# Patient Record
Sex: MALE | Race: WHITE | Employment: OTHER | URBAN - METROPOLITAN AREA
[De-identification: names, ages, dates, MRNs, and addresses within clinical notes are randomized per-mention and may not be internally consistent; named-entity substitution may affect disease eponyms.]

---

## 2017-07-10 ENCOUNTER — OFFICE VISIT (OUTPATIENT)
Dept: CARDIOLOGY CLINIC | Age: 62
End: 2017-07-10

## 2017-07-10 VITALS
DIASTOLIC BLOOD PRESSURE: 60 MMHG | BODY MASS INDEX: 28.34 KG/M2 | WEIGHT: 213.8 LBS | SYSTOLIC BLOOD PRESSURE: 100 MMHG | HEIGHT: 73 IN | OXYGEN SATURATION: 97 % | HEART RATE: 63 BPM

## 2017-07-10 DIAGNOSIS — E78.00 PURE HYPERCHOLESTEROLEMIA: Chronic | ICD-10-CM

## 2017-07-10 DIAGNOSIS — I25.10 ATHEROSCLEROSIS OF NATIVE CORONARY ARTERY OF NATIVE HEART WITHOUT ANGINA PECTORIS: Primary | ICD-10-CM

## 2017-07-10 DIAGNOSIS — I10 ESSENTIAL HYPERTENSION, BENIGN: ICD-10-CM

## 2017-07-10 PROCEDURE — 99214 OFFICE O/P EST MOD 30 MIN: CPT | Performed by: INTERNAL MEDICINE

## 2017-07-10 ASSESSMENT — ENCOUNTER SYMPTOMS
BLOOD IN STOOL: 0
SHORTNESS OF BREATH: 0
EYE REDNESS: 0
ABDOMINAL DISTENTION: 0
COUGH: 0
WHEEZING: 0

## 2020-12-11 ENCOUNTER — HOSPITAL ENCOUNTER (INPATIENT)
Dept: CARDIAC CATH/INVASIVE PROCEDURES | Age: 65
LOS: 1 days | Discharge: HOME | DRG: 250 | End: 2020-12-12
Attending: INTERNAL MEDICINE | Admitting: INTERNAL MEDICINE
Payer: MEDICARE

## 2020-12-11 PROBLEM — I21.4 NSTEMI (NON-ST ELEVATED MYOCARDIAL INFARCTION) (HCC): Status: ACTIVE | Noted: 2020-12-11

## 2020-12-11 LAB
CREAT SERPL-MCNC: <0.5 MG/DL (ref 0.8–1.3)
GFR AFRICAN AMERICAN: >60
GFR NON-AFRICAN AMERICAN: >60
GLUCOSE BLD-MCNC: 220 MG/DL (ref 70–99)
PERFORMED ON: ABNORMAL
POC ACT LR: 191 SEC
POC ACT LR: 242 SEC
POC ACT LR: 274 SEC

## 2020-12-11 PROCEDURE — 2500000003 HC RX 250 WO HCPCS

## 2020-12-11 PROCEDURE — 99152 MOD SED SAME PHYS/QHP 5/>YRS: CPT

## 2020-12-11 PROCEDURE — C1894 INTRO/SHEATH, NON-LASER: HCPCS

## 2020-12-11 PROCEDURE — 92924 PRQ TRLUML C ATHRC 1 ART&/BR: CPT | Performed by: INTERNAL MEDICINE

## 2020-12-11 PROCEDURE — 2580000003 HC RX 258

## 2020-12-11 PROCEDURE — 99223 1ST HOSP IP/OBS HIGH 75: CPT | Performed by: INTERNAL MEDICINE

## 2020-12-11 PROCEDURE — 2709999900 HC NON-CHARGEABLE SUPPLY

## 2020-12-11 PROCEDURE — 93458 L HRT ARTERY/VENTRICLE ANGIO: CPT

## 2020-12-11 PROCEDURE — C1724 CATH, TRANS ATHEREC,ROTATION: HCPCS

## 2020-12-11 PROCEDURE — 6360000002 HC RX W HCPCS: Performed by: INTERNAL MEDICINE

## 2020-12-11 PROCEDURE — 85347 COAGULATION TIME ACTIVATED: CPT

## 2020-12-11 PROCEDURE — C1887 CATHETER, GUIDING: HCPCS

## 2020-12-11 PROCEDURE — 6360000004 HC RX CONTRAST MEDICATION: Performed by: INTERNAL MEDICINE

## 2020-12-11 PROCEDURE — B2151ZZ FLUOROSCOPY OF LEFT HEART USING LOW OSMOLAR CONTRAST: ICD-10-PCS | Performed by: INTERNAL MEDICINE

## 2020-12-11 PROCEDURE — 6360000002 HC RX W HCPCS

## 2020-12-11 PROCEDURE — 2100000000 HC CCU R&B

## 2020-12-11 PROCEDURE — 4A023N7 MEASUREMENT OF CARDIAC SAMPLING AND PRESSURE, LEFT HEART, PERCUTANEOUS APPROACH: ICD-10-PCS | Performed by: INTERNAL MEDICINE

## 2020-12-11 PROCEDURE — 93458 L HRT ARTERY/VENTRICLE ANGIO: CPT | Performed by: INTERNAL MEDICINE

## 2020-12-11 PROCEDURE — 99152 MOD SED SAME PHYS/QHP 5/>YRS: CPT | Performed by: INTERNAL MEDICINE

## 2020-12-11 PROCEDURE — 82565 ASSAY OF CREATININE: CPT

## 2020-12-11 PROCEDURE — 6370000000 HC RX 637 (ALT 250 FOR IP): Performed by: INTERNAL MEDICINE

## 2020-12-11 PROCEDURE — C1769 GUIDE WIRE: HCPCS

## 2020-12-11 PROCEDURE — 02C03ZZ EXTIRPATION OF MATTER FROM CORONARY ARTERY, ONE ARTERY, PERCUTANEOUS APPROACH: ICD-10-PCS | Performed by: INTERNAL MEDICINE

## 2020-12-11 PROCEDURE — 93005 ELECTROCARDIOGRAM TRACING: CPT | Performed by: INTERNAL MEDICINE

## 2020-12-11 PROCEDURE — 92924 PRQ TRLUML C ATHRC 1 ART&/BR: CPT

## 2020-12-11 PROCEDURE — B2111ZZ FLUOROSCOPY OF MULTIPLE CORONARY ARTERIES USING LOW OSMOLAR CONTRAST: ICD-10-PCS | Performed by: INTERNAL MEDICINE

## 2020-12-11 PROCEDURE — 99153 MOD SED SAME PHYS/QHP EA: CPT

## 2020-12-11 PROCEDURE — 36415 COLL VENOUS BLD VENIPUNCTURE: CPT

## 2020-12-11 PROCEDURE — C1725 CATH, TRANSLUMIN NON-LASER: HCPCS

## 2020-12-11 RX ORDER — ASPIRIN 81 MG/1
81 TABLET ORAL DAILY
Status: DISCONTINUED | OUTPATIENT
Start: 2020-12-12 | End: 2020-12-12 | Stop reason: HOSPADM

## 2020-12-11 RX ORDER — ACETAMINOPHEN 325 MG/1
650 TABLET ORAL EVERY 4 HOURS PRN
Status: DISCONTINUED | OUTPATIENT
Start: 2020-12-11 | End: 2020-12-12 | Stop reason: HOSPADM

## 2020-12-11 RX ORDER — ONDANSETRON 2 MG/ML
4 INJECTION INTRAMUSCULAR; INTRAVENOUS EVERY 6 HOURS PRN
Status: DISCONTINUED | OUTPATIENT
Start: 2020-12-11 | End: 2020-12-12 | Stop reason: HOSPADM

## 2020-12-11 RX ORDER — MORPHINE SULFATE 2 MG/ML
2 INJECTION, SOLUTION INTRAMUSCULAR; INTRAVENOUS
Status: ACTIVE | OUTPATIENT
Start: 2020-12-11 | End: 2020-12-11

## 2020-12-11 RX ORDER — ATORVASTATIN CALCIUM 40 MG/1
40 TABLET, FILM COATED ORAL NIGHTLY
Status: DISCONTINUED | OUTPATIENT
Start: 2020-12-11 | End: 2020-12-12 | Stop reason: HOSPADM

## 2020-12-11 RX ORDER — NICOTINE POLACRILEX 4 MG
15 LOZENGE BUCCAL PRN
Status: DISCONTINUED | OUTPATIENT
Start: 2020-12-11 | End: 2020-12-12 | Stop reason: HOSPADM

## 2020-12-11 RX ORDER — HYDRALAZINE HYDROCHLORIDE 20 MG/ML
10 INJECTION INTRAMUSCULAR; INTRAVENOUS
Status: DISCONTINUED | OUTPATIENT
Start: 2020-12-11 | End: 2020-12-12 | Stop reason: HOSPADM

## 2020-12-11 RX ORDER — DEXTROSE MONOHYDRATE 50 MG/ML
100 INJECTION, SOLUTION INTRAVENOUS PRN
Status: DISCONTINUED | OUTPATIENT
Start: 2020-12-11 | End: 2020-12-12 | Stop reason: HOSPADM

## 2020-12-11 RX ORDER — EPTIFIBATIDE 0.75 MG/ML
2 INJECTION, SOLUTION INTRAVENOUS CONTINUOUS
Status: DISPENSED | OUTPATIENT
Start: 2020-12-11 | End: 2020-12-12

## 2020-12-11 RX ORDER — METOPROLOL SUCCINATE 50 MG/1
50 TABLET, EXTENDED RELEASE ORAL DAILY
Status: DISCONTINUED | OUTPATIENT
Start: 2020-12-11 | End: 2020-12-12 | Stop reason: HOSPADM

## 2020-12-11 RX ORDER — ATROPINE SULFATE 0.4 MG/ML
0.5 AMPUL (ML) INJECTION
Status: ACTIVE | OUTPATIENT
Start: 2020-12-11 | End: 2020-12-11

## 2020-12-11 RX ORDER — 0.9 % SODIUM CHLORIDE 0.9 %
250 INTRAVENOUS SOLUTION INTRAVENOUS PRN
Status: DISCONTINUED | OUTPATIENT
Start: 2020-12-11 | End: 2020-12-12 | Stop reason: HOSPADM

## 2020-12-11 RX ORDER — LISINOPRIL 40 MG/1
40 TABLET ORAL DAILY
Status: DISCONTINUED | OUTPATIENT
Start: 2020-12-11 | End: 2020-12-12 | Stop reason: HOSPADM

## 2020-12-11 RX ORDER — INSULIN GLARGINE 100 [IU]/ML
55 INJECTION, SOLUTION SUBCUTANEOUS NIGHTLY
Status: DISCONTINUED | OUTPATIENT
Start: 2020-12-11 | End: 2020-12-12 | Stop reason: HOSPADM

## 2020-12-11 RX ORDER — SODIUM CHLORIDE 0.9 % (FLUSH) 0.9 %
10 SYRINGE (ML) INJECTION EVERY 12 HOURS SCHEDULED
Status: DISCONTINUED | OUTPATIENT
Start: 2020-12-11 | End: 2020-12-12 | Stop reason: HOSPADM

## 2020-12-11 RX ORDER — DEXTROSE MONOHYDRATE 25 G/50ML
12.5 INJECTION, SOLUTION INTRAVENOUS PRN
Status: DISCONTINUED | OUTPATIENT
Start: 2020-12-11 | End: 2020-12-12 | Stop reason: HOSPADM

## 2020-12-11 RX ORDER — SODIUM CHLORIDE 0.9 % (FLUSH) 0.9 %
10 SYRINGE (ML) INJECTION PRN
Status: DISCONTINUED | OUTPATIENT
Start: 2020-12-11 | End: 2020-12-12 | Stop reason: HOSPADM

## 2020-12-11 RX ADMIN — ATORVASTATIN CALCIUM 40 MG: 40 TABLET, FILM COATED ORAL at 20:33

## 2020-12-11 RX ADMIN — IOPAMIDOL 120 ML: 755 INJECTION, SOLUTION INTRAVENOUS at 16:55

## 2020-12-11 RX ADMIN — EPTIFIBATIDE 2 MCG/KG/MIN: 75 INJECTION INTRAVENOUS at 23:14

## 2020-12-11 RX ADMIN — LISINOPRIL 40 MG: 40 TABLET ORAL at 23:14

## 2020-12-11 RX ADMIN — INSULIN GLARGINE 55 UNITS: 100 INJECTION, SOLUTION SUBCUTANEOUS at 20:33

## 2020-12-11 RX ADMIN — INSULIN LISPRO 2 UNITS: 100 INJECTION, SOLUTION INTRAVENOUS; SUBCUTANEOUS at 20:44

## 2020-12-11 RX ADMIN — EPTIFIBATIDE 2 MCG/KG/MIN: 75 INJECTION INTRAVENOUS at 17:39

## 2020-12-11 RX ADMIN — INSULIN LISPRO 2 UNITS: 100 INJECTION, SOLUTION INTRAVENOUS; SUBCUTANEOUS at 18:54

## 2020-12-11 RX ADMIN — TICAGRELOR 90 MG: 90 TABLET ORAL at 20:33

## 2020-12-11 ASSESSMENT — PAIN SCALES - GENERAL: PAINLEVEL_OUTOF10: 0

## 2020-12-11 NOTE — ANESTHESIA PRE-OP
Brief Pre-Op Note/Sedation Assessment      Cathi Part  1955  CATH/NONE      [id number]  3:14 PM    Planned Procedure: Cardiac Catheterization Procedure    Post Procedure Plan: Return to same level of care    Consent: I have discussed with the patient and/or the patient representative the indication, alternatives, and the possible risks and/or complications of the planned procedure and the anesthesia methods. The patient and/or patient representative appear to understand and agree to proceed. Chief Complaint: NSTEMI      Indications for Cath Procedure:  ACS > 24 hrs  Anginal Classification within 2 weeks:  CCS IV - Inability to perform any activity without angina or angina at rest, i.e., severe limitation  NYHA Heart Failure Class within 2 weeks: No symptoms  Is Cath Lab Visit Valve-related?: No  Surgical Risk: N/A  Functional Type: N/A    Anti- Anginal Meds within 2 weeks:   Yes: Beta Blockers, Aspirin and Statin (Any)    Stress or Imaging Studies Performed (within 6 months):  None     Vital Signs:  BP (!) 147/83   Pulse 59   Temp 97.8 °F (36.6 °C) (Infrared)   Resp 11   Ht 6' 1\" (1.854 m)   Wt 211 lb (95.7 kg)   SpO2 98%   BMI 27.84 kg/m²     Allergies: Allergies   Allergen Reactions    Kenalog [Triamcinolone Acetonide]     Pcn [Penicillins]     Sulfa Antibiotics        Past Medical History:  Past Medical History:   Diagnosis Date    CAD (coronary artery disease)     s/p RCA Taxus stent 04.   Nuc GXT 12/09 elroy    Diabetes     managed by PCP    HTN (hypertension)     controlled    Hyperlipidemia     rec semi annual lipids with LDL goal <70    Tobacco user     cessation discussed         Surgical History:  Past Surgical History:   Procedure Laterality Date    CARDIAC CATHETERIZATION      CHOLECYSTECTOMY      CORONARY ANGIOPLASTY WITH STENT PLACEMENT      LUNG SURGERY      MALIGNANT SKIN LESION EXCISION           Medications:  No current facility-administered medications for this encounter. Pre-Sedation:    Pre-Sedation Documentation and Exam:  I have personally completed a history, physical exam & review of systems for this patient (see notes). Prior History of Anesthesia Complications:   none    Modified Mallampati:  II (soft palate, uvula, fauces visible)    ASA Classification:  Class 2 - A normal healthy patient with mild systemic disease      Lisbet Scale: Activity:  2 - Able to move 4 extremities voluntarily on command  Respiration:  2 - Able to breathe deeply and cough freely  Circulation:  2 - BP+/- 20mmHg of normal  Consciousness:  2 - Fully awake  Oxygen Saturation (color):  2 - Able to maintain oxygen saturation >92% on room air    Sedation/Anesthesia Plan:  Guard the patient's safety and welfare. Minimize physical discomfort and pain. Minimize negative psychological responses to treatment by providing sedation and analgesia and maximize the potential amnesia. Patient to meet pre-procedure discharge plan.     Medication Planned:  midazolam intravenously and fentanyl intravenously    Patient is an appropriate candidate for plan of sedation: yes      Electronically signed by Molly Espana MD on 12/11/2020 at 3:14 PM

## 2020-12-11 NOTE — H&P
Möhe 62  [birth year]    December 11, 2020      CC: Chest Pain    HPI:  The patient is 76 y.o. male with a past medical history significant for type 2 DM and CAD with [rior RCA interventions by Dr. Real Marsh in 2004. He has really not followed up since then. He presented to [facility] with chest pain that is exertional in nature. It has been occurring for several months with less and less exertion. Over the last few days this has worsened. He had chest pain the other day that persisted so he came to the hospital. There was radiation of the pain into his right arm. This reminds him of his prior angina from 2004 but worse. He is having some mild chest pain at present. He was diagnosed with a NSTEMI at [facility]. Review of Systems:  Constitutional: No fatigue, weakness, night sweats or fever. HEENT: No new vision difficulties or ringing in the ears. Respiratory: No new SOB, PND, orthopnea or cough. Cardiovascular: See HPI   GI: No n/v, diarrhea, constipation, abdominal pain or changes in bowel habits. No melena, no hematochezia  : No urinary frequency, urgency, incontinence, hematuria or dysuria. Skin: No cyanosis or skin lesions. Musculoskeletal: No new muscle or joint pain. Neurological: No syncope or TIA-like symptoms. Psychiatric: No anxiety, insomnia or depression     Past Medical History:   Diagnosis Date    CAD (coronary artery disease)     s/p RCA Taxus stent 04.   Nuc GXT 12/09 elroy    Diabetes     managed by PCP    HTN (hypertension)     controlled    Hyperlipidemia     rec semi annual lipids with LDL goal <70    Tobacco user     cessation discussed     Past Surgical History:   Procedure Laterality Date    CARDIAC CATHETERIZATION      CHOLECYSTECTOMY      CORONARY ANGIOPLASTY WITH STENT PLACEMENT      LUNG SURGERY      MALIGNANT SKIN LESION EXCISION       Family History   Problem Relation Age of Onset    Diabetes Mother     High Blood Pressure Father      Social History     Tobacco Use    Smoking status: Current Every Day Smoker     Packs/day: 1.00     Years: 41.00     Pack years: 41.00     Types: Cigarettes    Smokeless tobacco: Never Used   Substance Use Topics    Alcohol use: No    Drug use: No       Allergies   Allergen Reactions    Kenalog [Triamcinolone Acetonide]     Pcn [Penicillins]     Sulfa Antibiotics      No current facility-administered medications for this encounter. Physical Exam:   BP (!) 147/83   Pulse 59   Temp 97.8 °F (36.6 °C) (Infrared)   Resp 11   Ht 6' 1\" (1.854 m)   Wt 211 lb (95.7 kg)   SpO2 98%   BMI 27.84 kg/m²   No intake or output data in the 24 hours ending 12/11/20 1513  Wt Readings from Last 2 Encounters:   12/11/20 211 lb (95.7 kg)   07/10/17 213 lb 12.8 oz (97 kg)     Constitutional: He is oriented to person, place, and time. He appears well-developed and well-nourished. In no acute distress. Head: Normocephalic and atraumatic. Neck: Neck supple. No JVD present. Carotid bruit is not present. No mass and no thyromegaly present. No lymphadenopathy present. Cardiovascular: Normal rate, regular rhythm, normal heart sounds and intact distal pulses. Exam reveals no gallop and no friction rub. No murmur heard. Pulmonary/Chest: Effort normal and breath sounds normal. No respiratory distress. He has no wheezes, rhonchi or rales. Abdominal: Soft, non-tender. Bowel sounds and aorta are normal. He exhibits no organomegaly, mass or bruit. Extremities: No edema, cyanosis, or clubbing. Pulses are 2+ radial/carotid/dorsalis pedis and posterior tibial bilaterally. Neurological: He is alert and oriented to person, place, and time. He has normal reflexes. No cranial nerve deficit. Coordination normal.   Skin: Skin is warm and dry. There is no rash or diaphoresis. Psychiatric: He has a normal mood and affect.  His speech is normal and behavior is normal.     EKG Interpretation: Sinus rhythm with inferior ST changes suggestive of ischemia      Assessment:  1. NSTEMI  2. Hyperlipidemia with goal LDL<70mg/dL  3. Essential Hypertension    Plan: We will take Carlos to the cath lab today for acoronary angiography. I discussed the risks and benefits of cardiac catheterization with the patient. I also discussed the possible therapies including medical management, angioplasty and stenting or coronary bypass surgery. The patient is amenable to undergoing the procedure. Sliding scale insulin for his DM. Hold Metformin. Starting Brilinta along with aspirin. Continue beta blockade and statin therapy.

## 2020-12-12 VITALS
BODY MASS INDEX: 29.63 KG/M2 | HEIGHT: 73 IN | TEMPERATURE: 97.3 F | DIASTOLIC BLOOD PRESSURE: 66 MMHG | OXYGEN SATURATION: 96 % | HEART RATE: 70 BPM | WEIGHT: 223.55 LBS | RESPIRATION RATE: 21 BRPM | SYSTOLIC BLOOD PRESSURE: 113 MMHG

## 2020-12-12 LAB
GLUCOSE BLD-MCNC: 149 MG/DL (ref 70–99)
GLUCOSE BLD-MCNC: 220 MG/DL (ref 70–99)
GLUCOSE BLD-MCNC: 302 MG/DL (ref 70–99)
PERFORMED ON: ABNORMAL
PLATELET # BLD: 165 K/UL (ref 135–450)

## 2020-12-12 PROCEDURE — 2580000003 HC RX 258: Performed by: INTERNAL MEDICINE

## 2020-12-12 PROCEDURE — 99238 HOSP IP/OBS DSCHRG MGMT 30/<: CPT | Performed by: INTERNAL MEDICINE

## 2020-12-12 PROCEDURE — 85049 AUTOMATED PLATELET COUNT: CPT

## 2020-12-12 PROCEDURE — 94761 N-INVAS EAR/PLS OXIMETRY MLT: CPT

## 2020-12-12 PROCEDURE — 36415 COLL VENOUS BLD VENIPUNCTURE: CPT

## 2020-12-12 PROCEDURE — 6370000000 HC RX 637 (ALT 250 FOR IP): Performed by: INTERNAL MEDICINE

## 2020-12-12 RX ADMIN — INSULIN LISPRO 1 UNITS: 100 INJECTION, SOLUTION INTRAVENOUS; SUBCUTANEOUS at 08:41

## 2020-12-12 RX ADMIN — INSULIN LISPRO 2 UNITS: 100 INJECTION, SOLUTION INTRAVENOUS; SUBCUTANEOUS at 12:07

## 2020-12-12 RX ADMIN — SODIUM CHLORIDE, PRESERVATIVE FREE 10 ML: 5 INJECTION INTRAVENOUS at 09:00

## 2020-12-12 RX ADMIN — LISINOPRIL 40 MG: 40 TABLET ORAL at 08:32

## 2020-12-12 RX ADMIN — ASPIRIN 81 MG: 81 TABLET, COATED ORAL at 08:31

## 2020-12-12 RX ADMIN — TICAGRELOR 90 MG: 90 TABLET ORAL at 08:32

## 2020-12-12 RX ADMIN — METOPROLOL SUCCINATE 50 MG: 50 TABLET, EXTENDED RELEASE ORAL at 08:32

## 2020-12-12 ASSESSMENT — PAIN SCALES - GENERAL
PAINLEVEL_OUTOF10: 0

## 2020-12-12 NOTE — PROGRESS NOTES
@8350- Patient arrived to CVU room 1310. 15ml air in radial compression device. Patient is alert and oriented x4, shift assessment complete. Puncture site/vital signs to be recorded per protocol. @0594- Handoff with Alicia Gaxiola RN.

## 2020-12-12 NOTE — PROCEDURES
[address] [address]                            CARDIAC CATHETERIZATION    PATIENT NAME: Abundio Zambrano                    :        1955  MED REC NO:   [id number]                          ROOM:       [address] [place] [address]  ACCOUNT NO:   [de-identified]                           ADMIT DATE: 2020  PROVIDER:     Roseanne Wright MD    DATE OF PROCEDURE:  2020    [address] Cardiology Catheterization    INDICATIONS FOR PROCEDURE:  Non-ST-elevation myocardial infarction. The risks and benefits of the procedure were explained to the patient. Informed consent was obtained. He was placed on the cardiac  catheterization table and prepped and draped in sterile fashion. The  anesthesia was provided in the volar surface of the right wrist with 1%  lidocaine injected subcutaneously. An Arcenio's test had been performed  on the right wrist to ensure an intact palmar arch. Using a micropuncture kit, the right radial artery was accessed and  cannulated. A 6-Wolof sheath inserted using Seldinger technique. The  pre-cocktail of heparin, verapamil and nitroglycerin was given through  the sheath port. Over the 0.035 J-wire, the JL-4 diagnostic catheter was advanced to the  ostium of left main coronary artery and coronary angiography was  performed to this system. Catheter was removed over the wire. Next,  the JR-5 5-Wolof diagnostic catheter was advanced to the ostium of the  right coronary artery. Coronary angiography was performed to this  system. The catheter was removed over the wire. Lastly, the pigtail  catheter was advanced over the wire in the left ventricle. Left  ventricular end-diastolic pressure measurements were obtained and then a  power injection left ventriculogram was performed.   Catheter was flushed  and placed back on pressure and then pulled back across the aortic valve

## 2020-12-12 NOTE — CARE COORDINATION
Discharge order noted. Chart reviewed. No discharge needs identified.      Electronically signed by Shruti Peterson RN MSN on 12/12/2020 at 3:52 PM

## 2020-12-13 LAB
EKG ATRIAL RATE: 57 BPM
EKG DIAGNOSIS: NORMAL
EKG P AXIS: 66 DEGREES
EKG P-R INTERVAL: 190 MS
EKG Q-T INTERVAL: 482 MS
EKG QRS DURATION: 180 MS
EKG QTC CALCULATION (BAZETT): 469 MS
EKG R AXIS: 106 DEGREES
EKG T AXIS: 31 DEGREES
EKG VENTRICULAR RATE: 57 BPM

## 2020-12-14 ENCOUNTER — TELEPHONE (OUTPATIENT)
Dept: CARDIOLOGY CLINIC | Age: 65
End: 2020-12-14

## 2020-12-14 NOTE — DISCHARGE SUMMARY
Mariana 62  [birth year]    December 14, 2020      Interval History:  S/p Rota RCA without stenting   Doing well, no complaints   Stable for discharge     CC: Chest Pain    HPI:  The patient is 76 y.o. male with a past medical history significant for type 2 DM and CAD with [rior RCA interventions by Dr. Flip Walter in 2004. He has really not followed up since then. He presented to [facility] with chest pain that is exertional in nature. It has been occurring for several months with less and less exertion. Over the last few days this has worsened. He had chest pain the other day that persisted so he came to the hospital. There was radiation of the pain into his right arm. This reminds him of his prior angina from 2004 but worse. He is having some mild chest pain at present. He was diagnosed with a NSTEMI at [facility]. Review of Systems:  Constitutional: No fatigue, weakness, night sweats or fever. HEENT: No new vision difficulties or ringing in the ears. Respiratory: No new SOB, PND, orthopnea or cough. Cardiovascular: See HPI   GI: No n/v, diarrhea, constipation, abdominal pain or changes in bowel habits. No melena, no hematochezia  : No urinary frequency, urgency, incontinence, hematuria or dysuria. Skin: No cyanosis or skin lesions. Musculoskeletal: No new muscle or joint pain. Neurological: No syncope or TIA-like symptoms. Psychiatric: No anxiety, insomnia or depression     Past Medical History:   Diagnosis Date    CAD (coronary artery disease)     s/p RCA Taxus stent 04.   Nuc GXT 12/09 elroy    Diabetes     managed by PCP    HTN (hypertension)     controlled    Hyperlipidemia     rec semi annual lipids with LDL goal <70    Tobacco user     cessation discussed     Past Surgical History:   Procedure Laterality Date    CARDIAC CATHETERIZATION      CHOLECYSTECTOMY      CORONARY ANGIOPLASTY WITH STENT PLACEMENT      LUNG SURGERY      MALIGNANT SKIN LESION EXCISION       Family History   Problem Relation Age of Onset    Diabetes Mother     High Blood Pressure Father      Social History     Tobacco Use    Smoking status: Current Every Day Smoker     Packs/day: 1.00     Years: 41.00     Pack years: 41.00     Types: Cigarettes    Smokeless tobacco: Never Used   Substance Use Topics    Alcohol use: No    Drug use: No       Allergies   Allergen Reactions    Kenalog [Triamcinolone Acetonide]     Pcn [Penicillins]     Sulfa Antibiotics      No current facility-administered medications for this encounter. Current Outpatient Medications   Medication Sig Dispense Refill    ticagrelor (BRILINTA) 90 MG TABS tablet Take 1 tablet by mouth 2 times daily 60 tablet 3    amLODIPine (NORVASC) 10 MG tablet TAKE 1 TABLET DAILY 90 tablet 3    Insulin Lispro, Human, (HUMALOG SC) Inject  into the skin.  atorvastatin (LIPITOR) 40 MG tablet Take 40 mg by mouth daily.  lisinopril (PRINIVIL;ZESTRIL) 40 MG tablet Take 1 tablet by mouth daily. 90 tablet 0    metformin (GLUCOPHAGE) 850 MG tablet Take 850 mg by mouth 3 times daily.  bisoprolol (ZEBETA) 5 MG tablet Take 10 mg by mouth daily       insulin glargine (LANTUS) 100 UNIT/ML injection Inject 60 Units into the skin nightly       aspirin 81 MG EC tablet Take 81 mg by mouth daily. Physical Exam:   /66   Pulse 70   Temp 97.3 °F (36.3 °C) (Axillary)   Resp 21   Ht 6' 1\" (1.854 m)   Wt 223 lb 8.7 oz (101.4 kg)   SpO2 96%   BMI 29.49 kg/m²   No intake or output data in the 24 hours ending 12/14/20 0921  Wt Readings from Last 2 Encounters:   12/12/20 223 lb 8.7 oz (101.4 kg)   07/10/17 213 lb 12.8 oz (97 kg)     Constitutional: He is oriented to person, place, and time. He appears well-developed and well-nourished. In no acute distress. Head: Normocephalic and atraumatic. Neck: Neck supple. No JVD present. Carotid bruit is not present. No mass and no thyromegaly present.  No lymphadenopathy present. Cardiovascular: Normal rate, regular rhythm, normal heart sounds and intact distal pulses. Exam reveals no gallop and no friction rub. No murmur heard. Pulmonary/Chest: Effort normal and breath sounds normal. No respiratory distress. He has no wheezes, rhonchi or rales. Abdominal: Soft, non-tender. Bowel sounds and aorta are normal. He exhibits no organomegaly, mass or bruit. Extremities: No edema, cyanosis, or clubbing. Pulses are 2+ radial/carotid/dorsalis pedis and posterior tibial bilaterally. Neurological: He is alert and oriented to person, place, and time. He has normal reflexes. No cranial nerve deficit. Coordination normal.   Skin: Skin is warm and dry. There is no rash or diaphoresis. Psychiatric: He has a normal mood and affect. His speech is normal and behavior is normal.     EKG Interpretation: Sinus rhythm with inferior ST changes suggestive of ischemia      Assessment:  1. NSTEMI  2. Hyperlipidemia with goal LDL<70mg/dL  3.  Essential Hypertension    Plan:  DAPT with asa/brilinta    Statin  Beta blockade  Staged outpatient PCI RCA next week    Discharge condition:  Stable   Discharge home     Goldy Trinh MD [address] [facility], Interventional Cardiology, and Peripheral Vascular [address]   (C): [phone]  (O): [phone]

## 2020-12-14 NOTE — TELEPHONE ENCOUNTER
Scheduled for left heart catheterization on 12/21/20 at 1:00 pm with 11:30 am arrival time. The morning of your procedure you will park in the hospital parking lot and report directly to the cath lab to check in.     Pre-Procedure Instructions   1. You will need to fast for at least 8 hours prior to procedure. No caffeine the morning of.   2. Hold all diabetic medications including, Metfomin. If you take Lantus/Levemir only take ½ your normal dose the evening before. All other medications can be taken in the morning with sips of water. 3. You will need to take 325 mg aspirin the morning of. If you are currently taking 81 mg please take 4 tablets that morning. 4. Do not use any lotions, creams or perfume the morning of procedure. 5. Pre-procedure lab work will need to be completed 5-7 days prior to procedure. Orders faxed to [facility]. 6. Please have a responsible adult to drive you home after procedure. We advise you have someone stay with you for the first 24 hours for precautionary measures. Depending on your procedure you may require an overnight stay. 7. Cath lab will provide you with all post procedure instructions. If you have any questions regarding the procedure itself or medications, please call [phone] and ask to speak with a nurse. Case published to [facility] and form emailed to Ross Palacios in the cath lab.

## 2020-12-21 ENCOUNTER — HOSPITAL ENCOUNTER (OUTPATIENT)
Dept: CARDIAC CATH/INVASIVE PROCEDURES | Age: 65
LOS: 1 days | Discharge: HOME | End: 2020-12-22
Attending: INTERNAL MEDICINE | Admitting: INTERNAL MEDICINE
Payer: MEDICARE

## 2020-12-21 PROBLEM — I20.0 UNSTABLE ANGINA (HCC): Status: ACTIVE | Noted: 2020-12-21

## 2020-12-21 PROBLEM — R07.9 CARDIAC CHEST PAIN: Status: ACTIVE | Noted: 2020-12-21

## 2020-12-21 LAB
ANION GAP SERPL CALCULATED.3IONS-SCNC: 12 MMOL/L (ref 3–16)
BUN BLDV-MCNC: 9 MG/DL (ref 7–20)
CALCIUM SERPL-MCNC: 9.4 MG/DL (ref 8.3–10.6)
CHLORIDE BLD-SCNC: 105 MMOL/L (ref 99–110)
CO2: 21 MMOL/L (ref 21–32)
CREAT SERPL-MCNC: <0.5 MG/DL (ref 0.8–1.3)
GFR AFRICAN AMERICAN: >60
GFR NON-AFRICAN AMERICAN: >60
GLUCOSE BLD-MCNC: 178 MG/DL (ref 70–99)
GLUCOSE BLD-MCNC: 259 MG/DL (ref 70–99)
HCT VFR BLD CALC: 39 % (ref 40.5–52.5)
HEMOGLOBIN: 13.2 G/DL (ref 13.5–17.5)
MCH RBC QN AUTO: 29.2 PG (ref 26–34)
MCHC RBC AUTO-ENTMCNC: 33.8 G/DL (ref 31–36)
MCV RBC AUTO: 86.5 FL (ref 80–100)
PDW BLD-RTO: 14.8 % (ref 12.4–15.4)
PERFORMED ON: ABNORMAL
PLATELET # BLD: 186 K/UL (ref 135–450)
PMV BLD AUTO: 9.2 FL (ref 5–10.5)
POC ACT LR: 247 SEC
POC ACT LR: 259 SEC
POTASSIUM SERPL-SCNC: 4.5 MMOL/L (ref 3.5–5.1)
RBC # BLD: 4.51 M/UL (ref 4.2–5.9)
REASON FOR REJECTION: NORMAL
REJECTED TEST: NORMAL
SODIUM BLD-SCNC: 138 MMOL/L (ref 136–145)
WBC # BLD: 8.4 K/UL (ref 4–11)

## 2020-12-21 PROCEDURE — 85347 COAGULATION TIME ACTIVATED: CPT

## 2020-12-21 PROCEDURE — 99152 MOD SED SAME PHYS/QHP 5/>YRS: CPT

## 2020-12-21 PROCEDURE — C1769 GUIDE WIRE: HCPCS

## 2020-12-21 PROCEDURE — 85027 COMPLETE CBC AUTOMATED: CPT

## 2020-12-21 PROCEDURE — C1887 CATHETER, GUIDING: HCPCS

## 2020-12-21 PROCEDURE — 93454 CORONARY ARTERY ANGIO S&I: CPT | Performed by: INTERNAL MEDICINE

## 2020-12-21 PROCEDURE — 2500000003 HC RX 250 WO HCPCS

## 2020-12-21 PROCEDURE — 99153 MOD SED SAME PHYS/QHP EA: CPT

## 2020-12-21 PROCEDURE — C1894 INTRO/SHEATH, NON-LASER: HCPCS

## 2020-12-21 PROCEDURE — 6370000000 HC RX 637 (ALT 250 FOR IP): Performed by: INTERNAL MEDICINE

## 2020-12-21 PROCEDURE — 92978 ENDOLUMINL IVUS OCT C 1ST: CPT

## 2020-12-21 PROCEDURE — 36415 COLL VENOUS BLD VENIPUNCTURE: CPT

## 2020-12-21 PROCEDURE — C1724 CATH, TRANS ATHEREC,ROTATION: HCPCS

## 2020-12-21 PROCEDURE — 6360000002 HC RX W HCPCS

## 2020-12-21 PROCEDURE — C1725 CATH, TRANSLUMIN NON-LASER: HCPCS

## 2020-12-21 PROCEDURE — 92924 PRQ TRLUML C ATHRC 1 ART&/BR: CPT

## 2020-12-21 PROCEDURE — 80048 BASIC METABOLIC PNL TOTAL CA: CPT

## 2020-12-21 PROCEDURE — 6360000004 HC RX CONTRAST MEDICATION: Performed by: INTERNAL MEDICINE

## 2020-12-21 PROCEDURE — C9602 PERC D-E COR STENT ATHER S: HCPCS

## 2020-12-21 PROCEDURE — 99152 MOD SED SAME PHYS/QHP 5/>YRS: CPT | Performed by: INTERNAL MEDICINE

## 2020-12-21 PROCEDURE — 2140000000 HC CCU INTERMEDIATE R&B

## 2020-12-21 PROCEDURE — 92924 PRQ TRLUML C ATHRC 1 ART&/BR: CPT | Performed by: INTERNAL MEDICINE

## 2020-12-21 PROCEDURE — C1753 CATH, INTRAVAS ULTRASOUND: HCPCS

## 2020-12-21 PROCEDURE — 93005 ELECTROCARDIOGRAM TRACING: CPT | Performed by: INTERNAL MEDICINE

## 2020-12-21 PROCEDURE — 2100000000 HC CCU R&B

## 2020-12-21 RX ORDER — ASPIRIN 81 MG/1
81 TABLET ORAL DAILY
Status: DISCONTINUED | OUTPATIENT
Start: 2020-12-22 | End: 2020-12-22 | Stop reason: HOSPADM

## 2020-12-21 RX ORDER — ATORVASTATIN CALCIUM 40 MG/1
40 TABLET, FILM COATED ORAL DAILY
Status: DISCONTINUED | OUTPATIENT
Start: 2020-12-22 | End: 2020-12-22 | Stop reason: HOSPADM

## 2020-12-21 RX ORDER — ACETAMINOPHEN 325 MG/1
650 TABLET ORAL EVERY 4 HOURS PRN
Status: DISCONTINUED | OUTPATIENT
Start: 2020-12-21 | End: 2020-12-22 | Stop reason: HOSPADM

## 2020-12-21 RX ORDER — SODIUM CHLORIDE 0.9 % (FLUSH) 0.9 %
10 SYRINGE (ML) INJECTION PRN
Status: DISCONTINUED | OUTPATIENT
Start: 2020-12-21 | End: 2020-12-22 | Stop reason: HOSPADM

## 2020-12-21 RX ORDER — ONDANSETRON 2 MG/ML
4 INJECTION INTRAMUSCULAR; INTRAVENOUS EVERY 6 HOURS PRN
Status: DISCONTINUED | OUTPATIENT
Start: 2020-12-21 | End: 2020-12-22 | Stop reason: HOSPADM

## 2020-12-21 RX ORDER — INSULIN GLARGINE 100 [IU]/ML
60 INJECTION, SOLUTION SUBCUTANEOUS NIGHTLY
Status: DISCONTINUED | OUTPATIENT
Start: 2020-12-21 | End: 2020-12-22 | Stop reason: HOSPADM

## 2020-12-21 RX ORDER — SODIUM CHLORIDE 9 MG/ML
INJECTION, SOLUTION INTRAVENOUS CONTINUOUS
Status: DISCONTINUED | OUTPATIENT
Start: 2020-12-21 | End: 2020-12-21

## 2020-12-21 RX ORDER — HYDRALAZINE HYDROCHLORIDE 20 MG/ML
10 INJECTION INTRAMUSCULAR; INTRAVENOUS
Status: DISCONTINUED | OUTPATIENT
Start: 2020-12-21 | End: 2020-12-22 | Stop reason: HOSPADM

## 2020-12-21 RX ORDER — DEXTROSE MONOHYDRATE 50 MG/ML
100 INJECTION, SOLUTION INTRAVENOUS PRN
Status: DISCONTINUED | OUTPATIENT
Start: 2020-12-21 | End: 2020-12-21 | Stop reason: SDUPTHER

## 2020-12-21 RX ORDER — MORPHINE SULFATE 2 MG/ML
2 INJECTION, SOLUTION INTRAMUSCULAR; INTRAVENOUS EVERY 4 HOURS PRN
Status: DISCONTINUED | OUTPATIENT
Start: 2020-12-21 | End: 2020-12-22 | Stop reason: HOSPADM

## 2020-12-21 RX ORDER — SODIUM CHLORIDE 0.9 % (FLUSH) 0.9 %
10 SYRINGE (ML) INJECTION EVERY 12 HOURS SCHEDULED
Status: DISCONTINUED | OUTPATIENT
Start: 2020-12-21 | End: 2020-12-22 | Stop reason: HOSPADM

## 2020-12-21 RX ORDER — ATROPINE SULFATE 0.4 MG/ML
0.5 AMPUL (ML) INJECTION
Status: DISCONTINUED | OUTPATIENT
Start: 2020-12-21 | End: 2020-12-21 | Stop reason: ALTCHOICE

## 2020-12-21 RX ORDER — NICOTINE POLACRILEX 4 MG
15 LOZENGE BUCCAL PRN
Status: DISCONTINUED | OUTPATIENT
Start: 2020-12-21 | End: 2020-12-22 | Stop reason: HOSPADM

## 2020-12-21 RX ORDER — MORPHINE SULFATE 2 MG/ML
2 INJECTION, SOLUTION INTRAMUSCULAR; INTRAVENOUS
Status: DISCONTINUED | OUTPATIENT
Start: 2020-12-21 | End: 2020-12-21 | Stop reason: SDUPTHER

## 2020-12-21 RX ORDER — AMLODIPINE BESYLATE 10 MG/1
10 TABLET ORAL DAILY
Status: DISCONTINUED | OUTPATIENT
Start: 2020-12-22 | End: 2020-12-22

## 2020-12-21 RX ORDER — 0.9 % SODIUM CHLORIDE 0.9 %
250 INTRAVENOUS SOLUTION INTRAVENOUS PRN
Status: DISCONTINUED | OUTPATIENT
Start: 2020-12-21 | End: 2020-12-22 | Stop reason: HOSPADM

## 2020-12-21 RX ORDER — SODIUM CHLORIDE 0.9 % (FLUSH) 0.9 %
10 SYRINGE (ML) INJECTION PRN
Status: DISCONTINUED | OUTPATIENT
Start: 2020-12-21 | End: 2020-12-21 | Stop reason: SDUPTHER

## 2020-12-21 RX ORDER — DEXTROSE MONOHYDRATE 50 MG/ML
100 INJECTION, SOLUTION INTRAVENOUS PRN
Status: DISCONTINUED | OUTPATIENT
Start: 2020-12-21 | End: 2020-12-22 | Stop reason: HOSPADM

## 2020-12-21 RX ORDER — DEXTROSE MONOHYDRATE 25 G/50ML
12.5 INJECTION, SOLUTION INTRAVENOUS PRN
Status: DISCONTINUED | OUTPATIENT
Start: 2020-12-21 | End: 2020-12-22 | Stop reason: HOSPADM

## 2020-12-21 RX ORDER — DEXTROSE MONOHYDRATE 25 G/50ML
12.5 INJECTION, SOLUTION INTRAVENOUS PRN
Status: DISCONTINUED | OUTPATIENT
Start: 2020-12-21 | End: 2020-12-21 | Stop reason: SDUPTHER

## 2020-12-21 RX ORDER — SODIUM CHLORIDE 0.9 % (FLUSH) 0.9 %
10 SYRINGE (ML) INJECTION EVERY 12 HOURS SCHEDULED
Status: DISCONTINUED | OUTPATIENT
Start: 2020-12-21 | End: 2020-12-21 | Stop reason: SDUPTHER

## 2020-12-21 RX ORDER — SODIUM CHLORIDE 9 MG/ML
INJECTION, SOLUTION INTRAVENOUS CONTINUOUS
Status: ACTIVE | OUTPATIENT
Start: 2020-12-21 | End: 2020-12-21

## 2020-12-21 RX ORDER — LISINOPRIL 40 MG/1
40 TABLET ORAL DAILY
Status: DISCONTINUED | OUTPATIENT
Start: 2020-12-22 | End: 2020-12-22

## 2020-12-21 RX ORDER — EPTIFIBATIDE 0.75 MG/ML
2 INJECTION, SOLUTION INTRAVENOUS CONTINUOUS
Status: ACTIVE | OUTPATIENT
Start: 2020-12-21 | End: 2020-12-21

## 2020-12-21 RX ADMIN — INSULIN GLARGINE 60 UNITS: 100 INJECTION, SOLUTION SUBCUTANEOUS at 20:33

## 2020-12-21 RX ADMIN — IOPAMIDOL 90 ML: 755 INJECTION, SOLUTION INTRAVENOUS at 18:06

## 2020-12-21 RX ADMIN — INSULIN LISPRO 2 UNITS: 100 INJECTION, SOLUTION INTRAVENOUS; SUBCUTANEOUS at 20:33

## 2020-12-21 RX ADMIN — TICAGRELOR 90 MG: 90 TABLET ORAL at 20:33

## 2020-12-21 ASSESSMENT — PAIN SCALES - GENERAL
PAINLEVEL_OUTOF10: 0
PAINLEVEL_OUTOF10: 0

## 2020-12-21 NOTE — ANESTHESIA PRE-OP
Brief Pre-Op Note/Sedation Assessment      Joey Henriuqez  1955  Room/bed info not found      [id number]  3:22 PM    Planned Procedure: Cardiac Catheterization Procedure    Post Procedure Plan: Return to same level of care    Consent: I have discussed with the patient and/or the patient representative the indication, alternatives, and the possible risks and/or complications of the planned procedure and the anesthesia methods. The patient and/or patient representative appear to understand and agree to proceed. Chief Complaint: Chest Pain/Pressure      Indications for Cath Procedure: Worsening Angina  Anginal Classification within 2 weeks:  CCS III - Symptoms with everyday living activities, i.e., moderate limitation  NYHA Heart Failure Class within 2 weeks: No symptoms  Is Cath Lab Visit Valve-related?: No  Surgical Risk: N/A  Functional Type: N/A    Anti- Anginal Meds within 2 weeks:   Yes: Beta Blockers, Aspirin and Statin (Any)    Stress or Imaging Studies Performed (within 6 months):  None     Vital Signs:  /77   Pulse 55   Temp 97.2 °F (36.2 °C) (Infrared)   Resp 18   Ht 6' 1\" (1.854 m)   Wt 222 lb (100.7 kg)   SpO2 97%   BMI 29.29 kg/m²     Allergies: Allergies   Allergen Reactions    Kenalog [Triamcinolone Acetonide]     Pcn [Penicillins]     Sulfa Antibiotics        Past Medical History:  Past Medical History:   Diagnosis Date    CAD (coronary artery disease)     s/p RCA Taxus stent 04.   Nuc GXT 12/09 elroy    Diabetes     managed by PCP    HTN (hypertension)     controlled    Hyperlipidemia     rec semi annual lipids with LDL goal <70    Tobacco user     cessation discussed         Surgical History:  Past Surgical History:   Procedure Laterality Date    CARDIAC CATHETERIZATION      CHOLECYSTECTOMY      CORONARY ANGIOPLASTY WITH STENT PLACEMENT      LUNG SURGERY      MALIGNANT SKIN LESION EXCISION           Medications:  Current Outpatient Medications Medication Sig Dispense Refill    ticagrelor (BRILINTA) 90 MG TABS tablet Take 1 tablet by mouth 2 times daily 60 tablet 3    amLODIPine (NORVASC) 10 MG tablet TAKE 1 TABLET DAILY 90 tablet 3    Insulin Lispro, Human, (HUMALOG SC) Inject  into the skin.  atorvastatin (LIPITOR) 40 MG tablet Take 40 mg by mouth daily.  lisinopril (PRINIVIL;ZESTRIL) 40 MG tablet Take 1 tablet by mouth daily. 90 tablet 0    metformin (GLUCOPHAGE) 850 MG tablet Take 850 mg by mouth 3 times daily.  bisoprolol (ZEBETA) 5 MG tablet Take 10 mg by mouth daily       insulin glargine (LANTUS) 100 UNIT/ML injection Inject 60 Units into the skin nightly       aspirin 81 MG EC tablet Take 81 mg by mouth daily. Current Facility-Administered Medications   Medication Dose Route Frequency Provider Last Rate Last Admin    0.9 % sodium chloride infusion   Intravenous Continuous Ryan Hatch MD        sodium chloride flush 0.9 % injection 10 mL  10 mL Intravenous 2 times per day Ryan Hatch MD        sodium chloride flush 0.9 % injection 10 mL  10 mL Intravenous PRN Ryan Hatch MD               Pre-Sedation:    Pre-Sedation Documentation and Exam:  I have personally completed a history, physical exam & review of systems for this patient (see notes). Prior History of Anesthesia Complications:   none    Modified Mallampati:  II (soft palate, uvula, fauces visible)    ASA Classification:  Class 3 - A patient with severe systemic disease that limits activity but is not incapacitating      Lisbet Scale: Activity:  2 - Able to move 4 extremities voluntarily on command  Respiration:  2 - Able to breathe deeply and cough freely  Circulation:  2 - BP+/- 20mmHg of normal  Consciousness:  2 - Fully awake  Oxygen Saturation (color):  2 - Able to maintain oxygen saturation >92% on room air    Sedation/Anesthesia Plan:  Guard the patient's safety and welfare. Minimize physical discomfort and pain. Minimize negative psychological responses to treatment by providing sedation and analgesia and maximize the potential amnesia. Patient to meet pre-procedure discharge plan.     Medication Planned:  midazolam intravenously and fentanyl intravenously    Patient is an appropriate candidate for plan of sedation: yes      Electronically signed by Willi Mariscal MD on 12/21/2020 at 3:22 PM

## 2020-12-21 NOTE — H&P
Psychiatric: He has a normal mood and affect. His speech is normal and behavior is normal.      EKG Interpretation: Sinus rhythm with inferior ST changes suggestive of ischemia        Assessment:  1. NSTEMI  2. Hyperlipidemia with goal LDL<70mg/dL  3. Essential Hypertension     Plan: We will take Carlos to the cath lab today for acoronary angiography. I discussed the risks and benefits of cardiac catheterization with the patient. I also discussed the possible therapies including medical management, angioplasty and stenting or coronary bypass surgery. The patient is amenable to undergoing the procedure.      Sliding scale insulin for his DM. Hold Metformin. Starting Brilinta along with aspirin. Continue beta blockade and statin therapy.      Vitals:    12/21/20 1200   BP: 116/77   Pulse: 55   Resp: 18   Temp: 97.2 °F (36.2 °C)   SpO2: 97%     I have reviewed the most recent H&P for this patient and independently examined the patient. There have been no changes. We will proceed with the planned procedure.     Melida Cogan, MD

## 2020-12-22 VITALS
DIASTOLIC BLOOD PRESSURE: 64 MMHG | RESPIRATION RATE: 17 BRPM | SYSTOLIC BLOOD PRESSURE: 128 MMHG | HEART RATE: 57 BPM | WEIGHT: 209.88 LBS | OXYGEN SATURATION: 94 % | HEIGHT: 73 IN | BODY MASS INDEX: 27.82 KG/M2 | TEMPERATURE: 97.9 F

## 2020-12-22 LAB
ANION GAP SERPL CALCULATED.3IONS-SCNC: 12 MMOL/L (ref 3–16)
BUN BLDV-MCNC: 10 MG/DL (ref 7–20)
CALCIUM SERPL-MCNC: 8.8 MG/DL (ref 8.3–10.6)
CHLORIDE BLD-SCNC: 106 MMOL/L (ref 99–110)
CO2: 23 MMOL/L (ref 21–32)
CREAT SERPL-MCNC: 0.5 MG/DL (ref 0.8–1.3)
EKG ATRIAL RATE: 52 BPM
EKG DIAGNOSIS: NORMAL
EKG P AXIS: 60 DEGREES
EKG P-R INTERVAL: 202 MS
EKG Q-T INTERVAL: 494 MS
EKG QRS DURATION: 186 MS
EKG QTC CALCULATION (BAZETT): 459 MS
EKG R AXIS: 101 DEGREES
EKG T AXIS: 3 DEGREES
EKG VENTRICULAR RATE: 52 BPM
ESTIMATED AVERAGE GLUCOSE: 168.6 MG/DL
GFR AFRICAN AMERICAN: >60
GFR NON-AFRICAN AMERICAN: >60
GLUCOSE BLD-MCNC: 104 MG/DL (ref 70–99)
GLUCOSE BLD-MCNC: 182 MG/DL (ref 70–99)
GLUCOSE BLD-MCNC: 99 MG/DL (ref 70–99)
HBA1C MFR BLD: 7.5 %
PERFORMED ON: ABNORMAL
PERFORMED ON: ABNORMAL
PLATELET # BLD: 176 K/UL (ref 135–450)
POC ACT LR: >400 SEC
POTASSIUM SERPL-SCNC: 3.9 MMOL/L (ref 3.5–5.1)
SODIUM BLD-SCNC: 141 MMOL/L (ref 136–145)

## 2020-12-22 PROCEDURE — 94760 N-INVAS EAR/PLS OXIMETRY 1: CPT

## 2020-12-22 PROCEDURE — 85049 AUTOMATED PLATELET COUNT: CPT

## 2020-12-22 PROCEDURE — 80048 BASIC METABOLIC PNL TOTAL CA: CPT

## 2020-12-22 PROCEDURE — 93010 ELECTROCARDIOGRAM REPORT: CPT | Performed by: INTERNAL MEDICINE

## 2020-12-22 PROCEDURE — 36415 COLL VENOUS BLD VENIPUNCTURE: CPT

## 2020-12-22 PROCEDURE — 6370000000 HC RX 637 (ALT 250 FOR IP): Performed by: INTERNAL MEDICINE

## 2020-12-22 PROCEDURE — 99239 HOSP IP/OBS DSCHRG MGMT >30: CPT | Performed by: INTERNAL MEDICINE

## 2020-12-22 PROCEDURE — 83036 HEMOGLOBIN GLYCOSYLATED A1C: CPT

## 2020-12-22 RX ORDER — BISOPROLOL FUMARATE 5 MG/1
5 TABLET ORAL DAILY
Qty: 30 TABLET | Refills: 3 | Status: SHIPPED
Start: 2020-12-22 | End: 2021-01-06 | Stop reason: DRUGHIGH

## 2020-12-22 RX ORDER — LISINOPRIL 40 MG/1
20 TABLET ORAL DAILY
Qty: 90 TABLET | Refills: 0 | Status: SHIPPED | OUTPATIENT
Start: 2020-12-22 | End: 2021-01-06

## 2020-12-22 RX ORDER — LISINOPRIL 20 MG/1
20 TABLET ORAL DAILY
Status: DISCONTINUED | OUTPATIENT
Start: 2020-12-23 | End: 2020-12-22 | Stop reason: HOSPADM

## 2020-12-22 RX ADMIN — TICAGRELOR 90 MG: 90 TABLET ORAL at 08:21

## 2020-12-22 RX ADMIN — INSULIN LISPRO 1 UNITS: 100 INJECTION, SOLUTION INTRAVENOUS; SUBCUTANEOUS at 12:36

## 2020-12-22 RX ADMIN — ASPIRIN 81 MG: 81 TABLET, COATED ORAL at 08:21

## 2020-12-22 RX ADMIN — ATORVASTATIN CALCIUM 40 MG: 40 TABLET, FILM COATED ORAL at 08:21

## 2020-12-22 ASSESSMENT — PAIN SCALES - GENERAL
PAINLEVEL_OUTOF10: 0
PAINLEVEL_OUTOF10: 0

## 2020-12-22 NOTE — PLAN OF CARE
Problem: Falls - Risk of:  Goal: Will remain free from falls  Description: Will remain free from falls  Outcome: Ongoing  Note: Fall risk precautions in place. Call light within reach. Frequent visual monitoring in place q1h. Bed/chair alarm activated as applicable. Problem: Bleeding:  Goal: Will show no signs and symptoms of excessive bleeding  Description: Will show no signs and symptoms of excessive bleeding  Outcome: Ongoing  Note: Visually assessed skin for hematomas, gross blood, swelling, and ecchymosis. Hemorrhage precautions in place. Vitals stable.

## 2020-12-22 NOTE — PROGRESS NOTES
Post procedure site check to right groin completed. Surgical site is within normal limits and appears to be free of complications. All concerns were reviewed and questions answered. Patient verbalized understanding.    Mace Halsted  8:32 AM

## 2020-12-22 NOTE — DISCHARGE SUMMARY
[facility] Discharge Note      Patient ID: Sirisha Singh 76 y.o. 1955    Admission Date: 12/21/2020     Discharge Date:  12/22/20    Reason for Admission:  Principal Diagnosis: Unstable Angina  Secondary Diagnosis: Hyperlipidemia with goal LDL<70mg/dL    Procedures:  Coronary Angiogram with atherectomy and PTCA to heavily calcified in-stent RCA restenosis    Brief Summary of Patient's Course: The patient is 76 y. o. male with a past medical history significant for type 2 DM and CAD with [rior RCA interventions by Dr. Morales Moses in 2004. He has really not followed up since then.     He presented to [facility] with chest pain that is exertional in nature. It has been occurring for several months with less and less exertion. Over the last few days this has worsened. He had chest pain the other day that persisted so he came to the hospital. There was radiation of the pain into his right arm. This reminds him of his prior angina from 2004 but worse. Rocco De Dios was brought back to [facility] yesterday for a femoral approach to this severe calcific in-stent RCA's restenosis. We performed rotational atherectomy with a 1.5 mm Rotablator bur throughout the entire length of the RCA followed by noncompliant balloon dilatation. No new stents were placed. He did well throughout the procedure. There was LINDA-3 flow in the vessel at the conclusion of the procedure. Today, he reports feeling well with no chest pain or shortness of breath. Sinus rhythm with right bundle branch block on telemetry. No arrhythmias.     Vitals:    12/22/20 0800   BP: 128/64   Pulse: 57   Resp: 17   Temp: 97.9 °F (36.6 °C)   SpO2: 94%   Regular rate and rhythm, normal S1-S2, no murmurs rubs or gallops  Lungs bilaterally clear to auscultation, no accessory muscle use  Abdomen soft nontender nondistended  No lower extremity edema  No right groin hematoma    Labs reviewed and unremarkable We will discharged home to home today on dual antiplatelet therapy with aspirin and Brilinta. He will be on bisoprolol for beta-blockade. We will stop his amlodipine and reduce his lisinopril to 20 mg down from 40 mg. He will remain on statin therapy. We will see him in the office in 2 weeks and refer him to cardiac rehab rehabilitation phase 2 at that time.     Total time of discharge greater than 30 minutes  The patient was in good condition and stable at discharge. Discharge Instructions:   Medication     activity Limitations:  No heavy lifting. Diet:  low sodium    Follow Up Instructions: To office in: in 2 weeks  Instructed Re: Medication    Discharge Medications:   Nando Hearn   Home Medication Instructions [id number]    Printed on:12/22/20 3151   Medication Information                      aspirin 81 MG EC tablet  Take 81 mg by mouth daily. atorvastatin (LIPITOR) 40 MG tablet  Take 40 mg by mouth daily. bisoprolol (ZEBETA) 5 MG tablet  Take 1 tablet by mouth daily             insulin glargine (LANTUS) 100 UNIT/ML injection  Inject 60 Units into the skin nightly              Insulin Lispro, Human, (HUMALOG SC)  Inject  into the skin. lisinopril (PRINIVIL;ZESTRIL) 40 MG tablet  Take 0.5 tablets by mouth daily             metformin (GLUCOPHAGE) 850 MG tablet  Take 850 mg by mouth 3 times daily. ticagrelor (BRILINTA) 90 MG TABS tablet  Take 1 tablet by mouth 2 times daily                                         Discharge Summary as above.      Attending Physician:   Anastasiya Dobson MD, 12/22/2020, 11:31 AM

## 2020-12-22 NOTE — PROGRESS NOTES
2000: Initial assessment completed. A/O x4. VSS on RA. SB on the monitor. Remains on bedrest until 2200. R groin intact, soft, and free of bleeding. +2 pedal pulses, denies numbness/tingling. Tolerating PO fluids well.     2200: Bedrest lifted. HOB increased for pt comfort. Groin remains unchanged. 2320: Ambulated to the restroom with steady gait. Denies dizziness or lightheadedness. 0500: Up to restroom. VSS. Groin unchanged.

## 2020-12-22 NOTE — PROGRESS NOTES
PIV removed at this time. Discharge instructions review with patient. Questions answered about groin site and care. 1350: wife at bedside.  Walked to private car

## 2020-12-22 NOTE — PROGRESS NOTES
1750- Patient brought over to CVU from cath lab and attached to CVU monitoring. Report reviewed with cath lab RN. Right groin soft and no hematoma or oozing noted. Occlusive dressing dry and intact. Pedal pulses easily palpated. 1845- patient able to eat dinner, no needs noted at this time. Groin remains stable. No oozing or hematoma noted. 1945- Report given to Hal Sood RN to assume care.

## 2020-12-31 NOTE — PROGRESS NOTES
Psychiatric: Denies anxiety, insomnia or depression     Past Medical History:   Diagnosis Date    CAD (coronary artery disease)     s/p RCA Taxus stent 04. Nuc GXT 12/09 elroy    Diabetes     managed by PCP    HTN (hypertension)     controlled    Hyperlipidemia     rec semi annual lipids with LDL goal <70    Tobacco user     cessation discussed     Past Surgical History:   Procedure Laterality Date    CARDIAC CATHETERIZATION      CHOLECYSTECTOMY      CORONARY ANGIOPLASTY WITH STENT PLACEMENT      LUNG SURGERY      MALIGNANT SKIN LESION EXCISION      PTCA  12/2020    Atherectomy of proximal, mid, distal RCA     Family History   Problem Relation Age of Onset    Diabetes Mother     High Blood Pressure Father      Social History     Tobacco Use    Smoking status: Current Every Day Smoker     Packs/day: 1.00     Years: 41.00     Pack years: 41.00     Types: Cigarettes    Smokeless tobacco: Never Used   Substance Use Topics    Alcohol use: No    Drug use: No       Allergies   Allergen Reactions    Kenalog [Triamcinolone Acetonide]     Pcn [Penicillins]     Sulfa Antibiotics      Current Outpatient Medications   Medication Sig Dispense Refill    bisoprolol (ZEBETA) 10 MG tablet Take 10 mg by mouth daily      lisinopril (PRINIVIL;ZESTRIL) 40 MG tablet Take 40 mg by mouth daily      ticagrelor (BRILINTA) 90 MG TABS tablet Take 1 tablet by mouth 2 times daily 60 tablet 3    Insulin Lispro, Human, (HUMALOG SC) Inject  into the skin.  atorvastatin (LIPITOR) 40 MG tablet Take 40 mg by mouth daily.  metformin (GLUCOPHAGE) 850 MG tablet Take 850 mg by mouth 3 times daily.  insulin glargine (LANTUS) 100 UNIT/ML injection Inject 60 Units into the skin nightly       aspirin 81 MG EC tablet Take 81 mg by mouth daily. No current facility-administered medications for this visit.         Physical Exam: present. There is some residual calcification at the bifurcation of the  posterolateral and PDA branches but LINDA III flow on these vessels. 12/11/2020 Coronary angiogram/PCI:  1. Right dominant coronary arterial circulation. There are stents in  the proximal and in the mid to distal RCA with a heavy calcification and  in-stent restenosis of serial lesions of 99%. These underwent treatment  with rotational atherectomy through the proximal to mid RCA. The  proximal to mid RCA resulted in less than 50% residual stenosis. There  was improvement in flow but still 90% stenosis in the distal RCA. In  the left system, there are 50% lesions in the proximal to mid LAD and in  the circumflex. There is trivial plaque disease and moderate  calcification throughout the left main and the LAD. 2.  Normal left ventricular systolic function. LV ejection fraction  50%. 3.  Normal left ventricular end-diastolic pressure. 4.  No gradient across the aortic valve on pullback to suggest aortic  Stenosis. Assessment:    1. NSTEMI (non-ST elevated myocardial infarction) ([address]  -multivessel CAD  -S/P atherectomy of prox and mid RCA on 12/11/2020  -S/P atherectomy of distal RCA on 12/21/2020 (staged procedure)  -50% mid prox-mid LAD and circ; LVEF 50%  -angina improved  -continue DAPT, statin, BB and ACE  -cardiac rehab referral    2. Essential hypertension  -controlled  -continue medical management  -goal BP < 130/80    3. Dyslipidemia  -continue high intensity statin    4. Smoking addiction  -cessation discussed    Plan:  Continue Brilinta. ASA, statin, bisoprolol, lisinopril  Refer to cardiac rehab at [address] and discussed strategies for smoking cessation (> 3-5 minutes of counseling given)  Discussed low fat/low sodium diet and reinforced regular aerobic exercise.   Follow up with Dr. Mi Schultz in 3-4 months or sooner if needed (pt choice) Return if symptoms worsen or fail to improve, for will be following with Dr. Kindra Cohn. Thanks for allowing me to participate in the care of this patient.       TIAGO Wallace  [facility], [address] [place] [address] [address] [facility] [address], [address] [place] [address]  Office: [phone]  Fax: [phone]      Electronically signed by JAREK Su CNP on 1/6/2021 at 3:20 PM

## 2021-01-06 ENCOUNTER — OFFICE VISIT (OUTPATIENT)
Dept: CARDIOLOGY CLINIC | Age: 66
End: 2021-01-06
Payer: MEDICARE

## 2021-01-06 VITALS
OXYGEN SATURATION: 97 % | TEMPERATURE: 98.6 F | HEIGHT: 73 IN | WEIGHT: 220.8 LBS | HEART RATE: 62 BPM | DIASTOLIC BLOOD PRESSURE: 78 MMHG | BODY MASS INDEX: 29.26 KG/M2 | SYSTOLIC BLOOD PRESSURE: 130 MMHG

## 2021-01-06 DIAGNOSIS — F17.200 SMOKING ADDICTION: ICD-10-CM

## 2021-01-06 DIAGNOSIS — I10 ESSENTIAL HYPERTENSION: ICD-10-CM

## 2021-01-06 DIAGNOSIS — E78.5 DYSLIPIDEMIA: ICD-10-CM

## 2021-01-06 DIAGNOSIS — I21.4 NSTEMI (NON-ST ELEVATED MYOCARDIAL INFARCTION) (HCC): Primary | ICD-10-CM

## 2021-01-06 PROCEDURE — 4040F PNEUMOC VAC/ADMIN/RCVD: CPT | Performed by: NURSE PRACTITIONER

## 2021-01-06 PROCEDURE — 1123F ACP DISCUSS/DSCN MKR DOCD: CPT | Performed by: NURSE PRACTITIONER

## 2021-01-06 PROCEDURE — 3017F COLORECTAL CA SCREEN DOC REV: CPT | Performed by: NURSE PRACTITIONER

## 2021-01-06 PROCEDURE — G8417 CALC BMI ABV UP PARAM F/U: HCPCS | Performed by: NURSE PRACTITIONER

## 2021-01-06 PROCEDURE — 1111F DSCHRG MED/CURRENT MED MERGE: CPT | Performed by: NURSE PRACTITIONER

## 2021-01-06 PROCEDURE — 93000 ELECTROCARDIOGRAM COMPLETE: CPT | Performed by: NURSE PRACTITIONER

## 2021-01-06 PROCEDURE — 4004F PT TOBACCO SCREEN RCVD TLK: CPT | Performed by: NURSE PRACTITIONER

## 2021-01-06 PROCEDURE — G8427 DOCREV CUR MEDS BY ELIG CLIN: HCPCS | Performed by: NURSE PRACTITIONER

## 2021-01-06 PROCEDURE — G8484 FLU IMMUNIZE NO ADMIN: HCPCS | Performed by: NURSE PRACTITIONER

## 2021-01-06 PROCEDURE — 99213 OFFICE O/P EST LOW 20 MIN: CPT | Performed by: NURSE PRACTITIONER

## 2021-01-06 RX ORDER — LISINOPRIL 40 MG/1
40 TABLET ORAL DAILY
COMMUNITY

## 2021-01-06 RX ORDER — BISOPROLOL FUMARATE 10 MG/1
10 TABLET ORAL DAILY
COMMUNITY

## 2021-01-18 ENCOUNTER — TELEPHONE (OUTPATIENT)
Dept: CARDIOLOGY CLINIC | Age: 66
End: 2021-01-18

## 2021-01-19 RX ORDER — CLOPIDOGREL BISULFATE 75 MG/1
75 TABLET ORAL DAILY
Qty: 93 TABLET | Refills: 0 | Status: SHIPPED | OUTPATIENT
Start: 2021-01-19 | End: 2021-04-28

## 2021-04-28 RX ORDER — CLOPIDOGREL BISULFATE 75 MG/1
75 TABLET ORAL DAILY
Qty: 30 TABLET | Refills: 0 | Status: SHIPPED | OUTPATIENT
Start: 2021-04-28

## 2021-04-28 NOTE — TELEPHONE ENCOUNTER
At [facility] in January 2021 patient reported he would not be following with us but with Dr. Gilberto Andrews. Will refill x 30 days only and any further refills need to come from his Primary Cardiologist Dr. Gilberto Andrews. Please notify patient and pharmacy of this change. Further refills per Dr. Gilberto Andrews. He was advised at [facility] in January that he needed to get an appointment with Dr. Gilberto Andrews in April or early May 2021 for further refills on his medications.

## 2021-05-24 RX ORDER — CLOPIDOGREL BISULFATE 75 MG/1
TABLET ORAL
Qty: 30 TABLET | Refills: 0 | OUTPATIENT
Start: 2021-05-24

## 2021-05-24 NOTE — TELEPHONE ENCOUNTER
Per encounter from 04/28/2021 it says pt states he would not be following with our office but with Dr. Jose Alas.

## 2021-06-03 RX ORDER — CLOPIDOGREL BISULFATE 75 MG/1
TABLET ORAL
Qty: 30 TABLET | Refills: 0 | OUTPATIENT
Start: 2021-06-03

## 2021-06-03 NOTE — TELEPHONE ENCOUNTER
Last OV: 01/6/21. Next OV: Not scheduled. Most recent Labs:   Last EKG (if needed):  (flecainide, sotalol, amiodarone, digoxin and propafenone within 1 year)      Per phone encounter on 4/28/21, patient will be following Dr. Aubrie Grey. Called [facility] in [place] [id number], spoke with [facility]) to check to see if Rx was sent to Dr. Aubrie Grey. She said that patient sent over refill request that is why it came to our office. [facility]) will send rx to Dr. Aubrie Grey. If any questions or problems, I requested that she call our office.